# Patient Record
Sex: FEMALE | Race: WHITE | NOT HISPANIC OR LATINO
[De-identification: names, ages, dates, MRNs, and addresses within clinical notes are randomized per-mention and may not be internally consistent; named-entity substitution may affect disease eponyms.]

---

## 2018-07-11 ENCOUNTER — RESULT REVIEW (OUTPATIENT)
Age: 23
End: 2018-07-11

## 2018-07-13 ENCOUNTER — ASOB RESULT (OUTPATIENT)
Age: 23
End: 2018-07-13

## 2018-07-13 ENCOUNTER — APPOINTMENT (OUTPATIENT)
Dept: ANTEPARTUM | Facility: CLINIC | Age: 23
End: 2018-07-13
Payer: COMMERCIAL

## 2018-07-13 PROCEDURE — 76830 TRANSVAGINAL US NON-OB: CPT

## 2018-07-13 PROCEDURE — 76857 US EXAM PELVIC LIMITED: CPT

## 2021-12-07 ENCOUNTER — APPOINTMENT (OUTPATIENT)
Dept: DERMATOLOGY | Facility: CLINIC | Age: 26
End: 2021-12-07

## 2022-02-15 ENCOUNTER — APPOINTMENT (OUTPATIENT)
Dept: OBGYN | Facility: CLINIC | Age: 27
End: 2022-02-15

## 2022-06-05 ENCOUNTER — NON-APPOINTMENT (OUTPATIENT)
Age: 27
End: 2022-06-05

## 2022-12-20 ENCOUNTER — NON-APPOINTMENT (OUTPATIENT)
Age: 27
End: 2022-12-20

## 2022-12-21 ENCOUNTER — APPOINTMENT (OUTPATIENT)
Dept: OBGYN | Facility: CLINIC | Age: 27
End: 2022-12-21

## 2022-12-21 ENCOUNTER — NON-APPOINTMENT (OUTPATIENT)
Age: 27
End: 2022-12-21

## 2022-12-21 VITALS
HEIGHT: 62 IN | BODY MASS INDEX: 44.9 KG/M2 | SYSTOLIC BLOOD PRESSURE: 108 MMHG | WEIGHT: 244 LBS | DIASTOLIC BLOOD PRESSURE: 70 MMHG

## 2022-12-21 DIAGNOSIS — Z78.9 OTHER SPECIFIED HEALTH STATUS: ICD-10-CM

## 2022-12-21 DIAGNOSIS — E03.9 HYPOTHYROIDISM, UNSPECIFIED: ICD-10-CM

## 2022-12-21 DIAGNOSIS — Z86.711 PERSONAL HISTORY OF PULMONARY EMBOLISM: ICD-10-CM

## 2022-12-21 DIAGNOSIS — E28.2 POLYCYSTIC OVARIAN SYNDROME: ICD-10-CM

## 2022-12-21 DIAGNOSIS — N92.0 EXCESSIVE AND FREQUENT MENSTRUATION WITH REGULAR CYCLE: ICD-10-CM

## 2022-12-21 DIAGNOSIS — Z80.0 FAMILY HISTORY OF MALIGNANT NEOPLASM OF DIGESTIVE ORGANS: ICD-10-CM

## 2022-12-21 DIAGNOSIS — Z01.419 ENCOUNTER FOR GYNECOLOGICAL EXAMINATION (GENERAL) (ROUTINE) W/OUT ABNORMAL FINDINGS: ICD-10-CM

## 2022-12-21 PROCEDURE — 99214 OFFICE O/P EST MOD 30 MIN: CPT | Mod: 25

## 2022-12-21 PROCEDURE — 99385 PREV VISIT NEW AGE 18-39: CPT

## 2022-12-21 RX ORDER — MEDROXYPROGESTERONE ACETATE 10 MG/1
10 TABLET ORAL DAILY
Qty: 10 | Refills: 0 | Status: ACTIVE | COMMUNITY
Start: 2022-12-21 | End: 1900-01-01

## 2022-12-21 NOTE — PLAN
[FreeTextEntry1] : 27-year-old female for well woman exam\par \par 1.  Pap done\par 2.  Self breast exam teaching done\par 3.  Patient with history of polycystic ovarian syndrome with menorrhagia and severe dysmenorrhea.  The patient has not been taking anything to help regulate her cycles.  We discussed polycystic ovarian syndrome.  We discussed increased risk in the future of hypertension, diabetes, hypercholesterolemia and metabolic syndrome.  We discussed issues with fertility with PCOS.  We also discussed the risk of endometrial hyperplasia or cancer in the future if she does not get her menses at least every 3 months.  The patient would like to have a work-up done.  She is thinking about starting metformin again.  Rx was given for day 3 labs.  She will have day 3 labs done and return to the office for a transvaginal sonogram and visit to review the sono and blood work.  We discussed that this will also help to assess her menorrhagia and dysmenorrhea.  She was also given information on inositol for hormonal regulation with PCOS.\par 4.  History of pulmonary embolism.  The patient will follow-up with her hematologist to see if she is cleared to start progesterone only options for cycle regulation, endometrial protection and contraception.\par 5.  History of hypothyroidism.  We discussed that on treated hypothyroidism can be a cause of irregular menstrual cycles.  The patient will follow-up with her primary care physician and restart Synthroid.\par 6.  Annual exam in 1 year\par

## 2022-12-21 NOTE — HISTORY OF PRESENT ILLNESS
[FreeTextEntry1] : 27-year-old female presents for well woman exam.  She is a new patient to our practice.  Her last GYN exam was approximately 2 years ago.  She would like to discuss her menses today.  Menarche was at the age of 15.  Her menses are typically every 28 to 128 days, lasting 7 to 14 days.  She has a history of heavy menstrual bleeding.  She states she has heavy bleeding for the first 3 days of her cycle.  She also gets severe dysmenorrhea.  She uses ibuprofen and heat with some relief.  She has a history of polycystic ovarian syndrome.  She was taking metformin in the past but stopped.  She also has a history of hypothyroidism.  She states she stopped taking her Synthroid.  She had her thyroid checked in July and states that her thyroid testing was very abnormal.  She did not restart her Synthroid.  She was on birth control pills in the past to regulate her cycle but in 2017 had a pulmonary embolism and had to stop taking birth control pills.  She is interested in starting something to help to regulate her cycle and for contraception.  Her GYN history is significant for chlamydia in the past that was treated.  She is using condoms for contraception at this time.\par \par Past medical history significant for PCOS, hypothyroidism and a pulmonary embolism.  She followed with hematology who did not find a cause of her PE.  The patient states she was also using drugs at this time which they thought could have been a cause or possibly OCPs.  She has never had surgery.  Family history is significant for maternal grandmother with colon cancer at the age of 65.  She occasionally drinks alcohol.  She denies tobacco.  She has not used illicit drugs in 4-1/2 years.  GYN history as above.  She is nulligravida.  She is allergic to amoxicillin and Cefzil.  She is currently not taking any medications.

## 2022-12-29 LAB
C TRACH RRNA SPEC QL NAA+PROBE: NOT DETECTED
N GONORRHOEA RRNA SPEC QL NAA+PROBE: NOT DETECTED
SOURCE TP AMPLIFICATION: NORMAL

## 2023-01-06 LAB — CYTOLOGY CVX/VAG DOC THIN PREP: ABNORMAL

## 2023-01-18 LAB
BASOPHILS # BLD AUTO: 0.05 K/UL
BASOPHILS NFR BLD AUTO: 0.6 %
CHOLEST SERPL-MCNC: 183 MG/DL
DHEA-S SERPL-MCNC: 91.5 UG/DL
EOSINOPHIL # BLD AUTO: 0.18 K/UL
EOSINOPHIL NFR BLD AUTO: 2.3 %
ESTRADIOL SERPL-MCNC: 43 PG/ML
FSH SERPL-MCNC: 3.8 IU/L
HCG SERPL-MCNC: <1 MIU/ML
HCT VFR BLD CALC: 41.9 %
HDLC SERPL-MCNC: 40 MG/DL
HGB BLD-MCNC: 13.2 G/DL
IMM GRANULOCYTES NFR BLD AUTO: 0.4 %
INSULIN P FAST SERPL-ACNC: 53.8 UU/ML
LDLC SERPL CALC-MCNC: 125 MG/DL
LH SERPL-ACNC: 4.6 IU/L
LYMPHOCYTES # BLD AUTO: 2.78 K/UL
LYMPHOCYTES NFR BLD AUTO: 36.1 %
MAN DIFF?: NORMAL
MCHC RBC-ENTMCNC: 26.6 PG
MCHC RBC-ENTMCNC: 31.5 GM/DL
MCV RBC AUTO: 84.3 FL
MONOCYTES # BLD AUTO: 0.53 K/UL
MONOCYTES NFR BLD AUTO: 6.9 %
NEUTROPHILS # BLD AUTO: 4.14 K/UL
NEUTROPHILS NFR BLD AUTO: 53.7 %
NONHDLC SERPL-MCNC: 143 MG/DL
PLATELET # BLD AUTO: 293 K/UL
PROGEST SERPL-MCNC: 0.2 NG/ML
PROLACTIN SERPL-MCNC: 24 NG/ML
RBC # BLD: 4.97 M/UL
RBC # FLD: 14.6 %
SHBG SERPL-SCNC: 25.4 NMOL/L
T4 FREE SERPL-MCNC: 1 NG/DL
TRIGL SERPL-MCNC: 91 MG/DL
TSH SERPL-ACNC: 2.59 UIU/ML
WBC # FLD AUTO: 7.71 K/UL

## 2023-01-19 LAB
ESTIMATED AVERAGE GLUCOSE: 114 MG/DL
HBA1C MFR BLD HPLC: 5.6 %

## 2023-01-20 LAB
TESTOST FREE SERPL-MCNC: 0.9 PG/ML
TESTOST SERPL-MCNC: 19.7 NG/DL

## 2023-02-03 LAB — 17OHP SERPL-MCNC: 11 NG/DL

## 2023-02-21 ENCOUNTER — APPOINTMENT (OUTPATIENT)
Dept: ANTEPARTUM | Facility: CLINIC | Age: 28
End: 2023-02-21

## 2023-02-21 ENCOUNTER — APPOINTMENT (OUTPATIENT)
Dept: OBGYN | Facility: CLINIC | Age: 28
End: 2023-02-21

## 2024-04-26 ENCOUNTER — APPOINTMENT (OUTPATIENT)
Dept: OBGYN | Facility: CLINIC | Age: 29
End: 2024-04-26

## 2024-12-10 ENCOUNTER — APPOINTMENT (OUTPATIENT)
Dept: OBGYN | Facility: CLINIC | Age: 29
End: 2024-12-10

## 2024-12-10 VITALS
BODY MASS INDEX: 39.56 KG/M2 | SYSTOLIC BLOOD PRESSURE: 122 MMHG | HEIGHT: 62 IN | DIASTOLIC BLOOD PRESSURE: 86 MMHG | WEIGHT: 215 LBS

## 2024-12-10 PROCEDURE — 99395 PREV VISIT EST AGE 18-39: CPT

## 2024-12-10 PROCEDURE — 99459 PELVIC EXAMINATION: CPT

## 2024-12-11 LAB
C TRACH RRNA SPEC QL NAA+PROBE: NOT DETECTED
HPV HIGH+LOW RISK DNA PNL CVX: DETECTED
N GONORRHOEA RRNA SPEC QL NAA+PROBE: NOT DETECTED
SOURCE TP AMPLIFICATION: NORMAL

## 2024-12-14 LAB — CYTOLOGY CVX/VAG DOC THIN PREP: ABNORMAL

## 2024-12-23 ENCOUNTER — NON-APPOINTMENT (OUTPATIENT)
Age: 29
End: 2024-12-23

## 2024-12-31 RX ORDER — FLUCONAZOLE 150 MG/1
150 TABLET ORAL
Qty: 2 | Refills: 0 | Status: ACTIVE | COMMUNITY
Start: 2024-12-31 | End: 1900-01-01

## 2024-12-31 RX ORDER — METRONIDAZOLE 500 MG/1
500 TABLET ORAL TWICE DAILY
Qty: 14 | Refills: 0 | Status: ACTIVE | COMMUNITY
Start: 2024-12-31 | End: 1900-01-01

## 2025-03-03 ENCOUNTER — APPOINTMENT (OUTPATIENT)
Dept: NEUROLOGY | Facility: CLINIC | Age: 30
End: 2025-03-03
Payer: COMMERCIAL

## 2025-03-03 PROCEDURE — 95886 MUSC TEST DONE W/N TEST COMP: CPT

## 2025-03-03 PROCEDURE — 95911 NRV CNDJ TEST 9-10 STUDIES: CPT

## 2025-03-04 ENCOUNTER — TRANSCRIPTION ENCOUNTER (OUTPATIENT)
Age: 30
End: 2025-03-04